# Patient Record
Sex: MALE | Race: WHITE | Employment: FULL TIME | URBAN - METROPOLITAN AREA
[De-identification: names, ages, dates, MRNs, and addresses within clinical notes are randomized per-mention and may not be internally consistent; named-entity substitution may affect disease eponyms.]

---

## 2017-01-20 ENCOUNTER — HOSPITAL ENCOUNTER (OUTPATIENT)
Dept: SURGERY | Age: 62
Discharge: HOME | End: 2017-01-20
Attending: OTOLARYNGOLOGY | Admitting: OTOLARYNGOLOGY

## 2017-01-20 VITALS
BODY MASS INDEX: 22.4 KG/M2 | DIASTOLIC BLOOD PRESSURE: 86 MMHG | SYSTOLIC BLOOD PRESSURE: 158 MMHG | RESPIRATION RATE: 16 BRPM | HEART RATE: 61 BPM | HEIGHT: 71 IN | OXYGEN SATURATION: 99 % | WEIGHT: 160 LBS | TEMPERATURE: 97.6 F

## 2017-01-20 ASSESSMENT — PAIN - FUNCTIONAL ASSESSMENT: PAIN_FUNCTIONAL_ASSESSMENT: 0-10

## 2018-09-21 ENCOUNTER — HOSPITAL ENCOUNTER (OUTPATIENT)
Age: 63
Setting detail: OUTPATIENT SURGERY
Discharge: HOME | End: 2018-09-21
Attending: OTOLARYNGOLOGY | Admitting: OTOLARYNGOLOGY
Payer: COMMERCIAL

## 2018-09-21 VITALS
TEMPERATURE: 97.7 F | RESPIRATION RATE: 16 BRPM | BODY MASS INDEX: 23.1 KG/M2 | DIASTOLIC BLOOD PRESSURE: 69 MMHG | HEIGHT: 71 IN | HEART RATE: 59 BPM | WEIGHT: 165 LBS | SYSTOLIC BLOOD PRESSURE: 149 MMHG | OXYGEN SATURATION: 99 %

## 2018-09-21 DIAGNOSIS — C44.329 SQUAMOUS CELL CARCINOMA OF SKIN OF RIGHT TEMPLE: ICD-10-CM

## 2018-09-21 PROCEDURE — 7100000010 HC PHASE II RECOVERY - FIRST 15 MIN: Performed by: OTOLARYNGOLOGY

## 2018-09-21 PROCEDURE — 3600000013 HC SURGERY LEVEL 3 ADDTL 15MIN: Performed by: OTOLARYNGOLOGY

## 2018-09-21 PROCEDURE — 88331 PATH CONSLTJ SURG 1 BLK 1SPC: CPT

## 2018-09-21 PROCEDURE — 2580000003 HC RX 258: Performed by: OTOLARYNGOLOGY

## 2018-09-21 PROCEDURE — 2709999900 HC NON-CHARGEABLE SUPPLY: Performed by: OTOLARYNGOLOGY

## 2018-09-21 PROCEDURE — 2500000003 HC RX 250 WO HCPCS: Performed by: OTOLARYNGOLOGY

## 2018-09-21 PROCEDURE — 3600000003 HC SURGERY LEVEL 3 BASE: Performed by: OTOLARYNGOLOGY

## 2018-09-21 PROCEDURE — 88305 TISSUE EXAM BY PATHOLOGIST: CPT

## 2018-09-21 RX ORDER — MAGNESIUM HYDROXIDE 1200 MG/15ML
LIQUID ORAL CONTINUOUS PRN
Status: DISCONTINUED | OUTPATIENT
Start: 2018-09-21 | End: 2018-09-21 | Stop reason: HOSPADM

## 2018-09-21 RX ORDER — LIDOCAINE HYDROCHLORIDE AND EPINEPHRINE 10; 10 MG/ML; UG/ML
INJECTION, SOLUTION INFILTRATION; PERINEURAL PRN
Status: DISCONTINUED | OUTPATIENT
Start: 2018-09-21 | End: 2018-09-21 | Stop reason: HOSPADM

## 2018-09-21 ASSESSMENT — PAIN SCALES - GENERAL: PAINLEVEL_OUTOF10: 0

## 2018-09-21 ASSESSMENT — PAIN - FUNCTIONAL ASSESSMENT: PAIN_FUNCTIONAL_ASSESSMENT: 0-10

## 2018-10-02 NOTE — OP NOTE
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 OPERATION PERFORMED:  Excision of squamous cell carcinoma of the right  temple with intermediate reconstruction. COMPLICATIONS:  None. INDICATION FOR SURGICAL PROCEDURE:  This is a 59-year-old white male who  has had multiple skin cancers removed in the past.  The patient states that  at the end of the July, he first noticed a red crusting rough lesion of his  right temple skin. That was about the size of an eraser head on a pencil. The lesion was increasing in size, but did not bleed or feel painful. On  2018, Dr. 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 The patient presents at this time for excision under local  anesthesia with frozen pathology and reconstruction. OPERATIVE PROCEDURE:  The patient was taken to the operating room and  placed on the operating table in the supine position where he was prepped  and draped in the usual sterile fashion. Examination of the patient  reveals a well-healed biopsy site of the right temple that measured 0.7 x  0.5 cm. A 1% lidocaine with 1:100,000 epinephrine was injected above the  lesion site for a total of 2 mL. A 10-minute wait was carried out to allow  the epinephrine to take effect.   Using a 15 scalpel blade in an elliptical  fashion, the skin was then incised and

## 2019-05-13 NOTE — PROGRESS NOTES
[facility]    Age 59 y.o.    male    1955    MRN [id number]    Date___________   Arrival Time_____________  OR Time____________Duration____     Procedure(s):  EXCISION OF BASAL CELL CARCINOMA OF LEFT EYEBROW, LEFT LATERAL INVASIVE WITH CLOSURE, EXCISION OF SQUAMOUS CELL CARCINOMA IN SITU OF SKIN OF LEFT CHEEK WITH CLOSURE  -FROZEN SECTION    Surgeon  ________________________________  [facility]   General   [place]       Phone [phone] (home)       [place] [address]  Cell Work  ______________________________________________________________________________________________________________________________________________________________________________________________________________________________________________________________________________________________________________________________________________________________    PCP__________________________Phone__________________________________      H&P__________________Bringing      Chart              Epic    DOS           Called_______  EKG__________________Bringing      Chart              Epic    DOS           Called_______  LAB__________________ Bringing      Chart              Epic    DOS           Called_______  CardiacClearance _______Bringing      Chart              Epic    DOS           Called_______      Cardiologist________________________ Phone___________________________      ? Judaism concerns / Waiver on Chart            PAT Communications________________  ? Pre-op Instructions Given [place]          _________________________________  ? Directions to Surgery Center                          _________________________________  ? Transportation Home_______________      _________________________________  ?  Crutches/Walker__________________        _________________________________      ________Pre-op Orders   _______Transcribed    _______Wt.  ________Pharmacy          _______SCD  ______VTE     ______[employer]  ________Consent

## 2019-05-17 ENCOUNTER — HOSPITAL ENCOUNTER (OUTPATIENT)
Age: 64
Setting detail: OUTPATIENT SURGERY
Discharge: HOME | End: 2019-05-17
Attending: OTOLARYNGOLOGY | Admitting: OTOLARYNGOLOGY
Payer: COMMERCIAL

## 2019-05-17 VITALS
DIASTOLIC BLOOD PRESSURE: 71 MMHG | RESPIRATION RATE: 16 BRPM | SYSTOLIC BLOOD PRESSURE: 150 MMHG | TEMPERATURE: 98.7 F | HEIGHT: 71 IN | WEIGHT: 162 LBS | BODY MASS INDEX: 22.68 KG/M2 | HEART RATE: 75 BPM

## 2019-05-17 DIAGNOSIS — D04.39 SQUAMOUS CELL CARCINOMA IN SITU (SCCIS) OF SKIN OF LEFT CHEEK: ICD-10-CM

## 2019-05-17 DIAGNOSIS — C44.319 BASAL CELL CARCINOMA OF EYEBROW: ICD-10-CM

## 2019-05-17 PROCEDURE — 88331 PATH CONSLTJ SURG 1 BLK 1SPC: CPT

## 2019-05-17 PROCEDURE — 2500000003 HC RX 250 WO HCPCS: Performed by: OTOLARYNGOLOGY

## 2019-05-17 PROCEDURE — 88305 TISSUE EXAM BY PATHOLOGIST: CPT

## 2019-05-17 PROCEDURE — 7100000010 HC PHASE II RECOVERY - FIRST 15 MIN: Performed by: OTOLARYNGOLOGY

## 2019-05-17 PROCEDURE — 3600000013 HC SURGERY LEVEL 3 ADDTL 15MIN: Performed by: OTOLARYNGOLOGY

## 2019-05-17 PROCEDURE — 7100000011 HC PHASE II RECOVERY - ADDTL 15 MIN: Performed by: OTOLARYNGOLOGY

## 2019-05-17 PROCEDURE — 2709999900 HC NON-CHARGEABLE SUPPLY: Performed by: OTOLARYNGOLOGY

## 2019-05-17 PROCEDURE — 3600000003 HC SURGERY LEVEL 3 BASE: Performed by: OTOLARYNGOLOGY

## 2019-05-17 RX ORDER — LIDOCAINE HYDROCHLORIDE AND EPINEPHRINE 10; 10 MG/ML; UG/ML
INJECTION, SOLUTION INFILTRATION; PERINEURAL PRN
Status: DISCONTINUED | OUTPATIENT
Start: 2019-05-17 | End: 2019-05-17 | Stop reason: ALTCHOICE

## 2019-05-17 ASSESSMENT — PAIN - FUNCTIONAL ASSESSMENT: PAIN_FUNCTIONAL_ASSESSMENT: 0-10

## 2019-05-17 ASSESSMENT — PAIN SCALES - GENERAL: PAINLEVEL_OUTOF10: 0

## 2019-05-17 NOTE — BRIEF OP NOTE
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

## 2019-05-17 NOTE — H&P
H&P     This is a 58 yo WM who has had 5 previous excisions at multiple sites of face. Patient now sees his dermatologist (Dr. Richard Kothari) every 3 months. He had a biopsy done on 4/25/19 by Dr. Richard Kothari for a lesion of the left lateral brow that revealed an invasive nodular BCC, and left cheek with SCC in situ. He presents for excision of both lesions under local anesthesia with frozen path and reconstruction. His exam reveals a 1.2 X 0.9 cm healed erythematous biopsy site just at the margin of the left brow hair into the lateral lid. The left cheek reveals a 0.5 X 0.5 cm healed erythematous crusted lesion of the mid left cheek. There are multiple healed surgical scars of the face with actinic keratosis. No cervical or parotid adenopathy. The remainder of her medical history and physical exam (including cardiopulmonary) reveals no contraindication to excision under local anesthesia. The procedure, risks and benefits were discussed with the patient and permit signed.

## 2019-06-07 NOTE — OP NOTE
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 OPERATION PERFORMED:  Excision of basal cell carcinoma of the left  eyebrow and upper eyelid with layered closure and excision of squamous  cell carcinoma of the left cheek with layered closure. COMPLICATIONS:  None. INDICATIONS FOR SURGICAL PROCEDURE:  This is a 55-year-old white male  who has had five previous excisions of multiple sites of his face by me. The patient now sees his dermatologist every 3 months. He had a biopsy  done on 2019 by Dr. Albina Claudio for lesion of the left lateral brow that  revealed an invasive nodular basal cell carcinoma as well as a biopsy  of the left cheek which revealed squamous cell carcinoma in situ. He  presents for excision of both lesions under local anesthesia with frozen  pathology and reconstruction. OPERATIVE PROCEDURE:  The patient was taken to the operating room,  placed on the operating table in supine position where he was prepped  and draped in the usual sterile fashion. Examination reveals a 1.2 x  0.9 cm healed erythematous biopsy site just at the margin of the left  brow hair into the lateral upper lid. The left cheek reveals a 0.5 x 0.5  cm healed erythematous crusted lesion of the mid left cheek.   There are  multiple surgical scars of the face and actinic keratosis. 1% lidocaine with 1:100,000 epinephrine was injected about both sites  for a total of 1.5  mL. A 10-minute wait was carried out to allow the  epinephrine to take effect. First, attention was directed towards the  left eyelid brow lesion. Using a #15 scalpel blade in an elliptical  fashion, the erythematous tissue was excised down to the fatty  subcutaneous tissue. This left a surgical defect of 2 x 0.8 cm. The  specimen was sent for frozen pathology. Hemostasis was achieved with a  bipolar cautery unit. Waiting for the results of the pathology for the  brow, the left cheek lesion was excised. Once again using a different  #15 scalpel blade in an elliptical fashion, the skin was incised and  carried down to the fatty subcutaneus tissue. This left a surgical  defect of 1.8 x 0.8 cm. Hemostasis in the surgical bed was achieved  with bipolar cautery unit. The frozen pathology from the left eyelid revealed a superficial basal  cell carcinoma with all margins positive except the deep as well as the  Tips. Re-excision (#2) was carried out of the superior and inferior  margins of 2 mm and sent for frozen pathology. This also was  positive for superficial squamous cell carcinoma. Another re-excision (#3)  was carried out of both superior and inferior margins and sent for frozen  Pathology. Again revealed SCC. Re-excision(#4) of of 2-3 mm of the superior and inferior margin was carried out,  and at this time, the reexcision of the inferior margin was  negative for any cancer. The superior margin was positive for nodular  basal cell carcinoma but with negative margins. The frozen pathology  from the cheek lesion revealed no further carcinoma only actinic  keratosis. Hemostasis at both sites was achieved with bipolar cautery. The cheek incision was closed with a 5-0 chromic in an  interrupted fashion and a 6-0 Prolene in an interrupted fashion.   The  brow excision was

## (undated) DEVICE — GLOVE,SURG,SENSICARE,ALOE,LF,PF,7: Brand: MEDLINE

## (undated) DEVICE — CORD ES L12FT BPLR FRCP

## (undated) DEVICE — TOWEL,OR,DSP,ST,WHITE,DLX,4/PK,20PK/CS: Brand: MEDLINE

## (undated) DEVICE — GOWN,SIRUS,POLYRNF,SETINSLV,L,20/CS: Brand: MEDLINE

## (undated) DEVICE — CONVERTED USE 304929 SPONGE GAUZE CURITY 4X4IN 16-PLY ST

## (undated) DEVICE — SURGICAL PROCEDURE PACK IV U-BAR

## (undated) DEVICE — NEEDLE HYPO 27GA L1.25IN GRY POLYPR HUB S STL REG BVL STR

## (undated) DEVICE — GLOVE SURG SZ 65 L12IN FNGR THK75MIL WHT LTX POLYMER BEAD

## (undated) DEVICE — TUBING SUCT 10FR MAL ALUM SHFT FN CAP VENT UNIV CONN W/ OBT

## (undated) DEVICE — SYRINGE MED 3ML CLR PLAS STD N CTRL LUERLOCK TIP DISP

## (undated) DEVICE — MEDI-VAC NON-CONDUCTIVE SUCTION TUBING: Brand: CARDINAL HEALTH

## (undated) DEVICE — MINOR SET UP PK

## (undated) DEVICE — 1200CC HIFLOW SUCTION CANISTER WITH AEROSTAT FILTER, FLOAT VALVE SHUTOFF WITH GREEN LID: Brand: BEMIS

## (undated) DEVICE — SOLUTION IV IRRIG 500ML 0.9% SODIUM CHL 2F7123

## (undated) DEVICE — SUTURE CHROMIC GUT SZ 5-0 L18IN ABSRB BRN P-3 L13MM 3/8 CIR 687G

## (undated) DEVICE — SUTURE PROL SZ 6-0 L18IN NONABSORBABLE BLU P-3 L13MM 3/8 8695G

## (undated) DEVICE — SUTURE CHROMIC GUT SZ 5-0 L18IN ABSRB BRN P-2 L8MM 1/2 CIR 1658G

## (undated) DEVICE — GOWN,SIRUS,NON REINFRCD,LARGE,SET IN SL: Brand: MEDLINE

## (undated) DEVICE — Z CONVERTED USE 2271043 CONTAINER SPEC COLL 4OZ SCR ON LID PEEL PCH

## (undated) DEVICE — X-RAY DETECTABLE SPONGES,16 PLY: Brand: VISTEC

## (undated) DEVICE — ELECTRODE,ECG,STRESS,FOAM,3PK: Brand: MEDLINE

## (undated) DEVICE — INTENDED FOR TISSUE SEPARATION, AND OTHER PROCEDURES THAT REQUIRE A SHARP SURGICAL BLADE TO PUNCTURE OR CUT.: Brand: BARD-PARKER ® STAINLESS STEEL BLADES